# Patient Record
Sex: FEMALE | Race: ASIAN | NOT HISPANIC OR LATINO | URBAN - METROPOLITAN AREA
[De-identification: names, ages, dates, MRNs, and addresses within clinical notes are randomized per-mention and may not be internally consistent; named-entity substitution may affect disease eponyms.]

---

## 2024-09-04 ENCOUNTER — OFFICE VISIT (OUTPATIENT)
Dept: URGENT CARE | Facility: CLINIC | Age: 59
End: 2024-09-04
Payer: COMMERCIAL

## 2024-09-04 VITALS
SYSTOLIC BLOOD PRESSURE: 142 MMHG | DIASTOLIC BLOOD PRESSURE: 88 MMHG | TEMPERATURE: 98.5 F | RESPIRATION RATE: 14 BRPM | HEART RATE: 88 BPM | OXYGEN SATURATION: 97 %

## 2024-09-04 DIAGNOSIS — G89.29 CHRONIC PAIN OF BOTH SHOULDERS: Primary | ICD-10-CM

## 2024-09-04 DIAGNOSIS — M25.512 CHRONIC PAIN OF BOTH SHOULDERS: Primary | ICD-10-CM

## 2024-09-04 DIAGNOSIS — M25.511 CHRONIC PAIN OF BOTH SHOULDERS: Primary | ICD-10-CM

## 2024-09-04 PROBLEM — M79.18 MYOFASCIAL PAIN: Status: ACTIVE | Noted: 2022-01-06

## 2024-09-04 PROBLEM — Z74.09 IMPAIRED FUNCTIONAL MOBILITY, BALANCE, GAIT, AND ENDURANCE: Status: ACTIVE | Noted: 2022-01-06

## 2024-09-04 PROBLEM — C81.10: Status: ACTIVE | Noted: 2022-04-18

## 2024-09-04 PROBLEM — E66.01 MORBID (SEVERE) OBESITY DUE TO EXCESS CALORIES (HCC): Status: ACTIVE | Noted: 2023-10-19

## 2024-09-04 PROCEDURE — 99203 OFFICE O/P NEW LOW 30 MIN: CPT | Performed by: FAMILY MEDICINE

## 2024-09-04 RX ORDER — ATORVASTATIN CALCIUM 20 MG/1
20 TABLET, FILM COATED ORAL
COMMUNITY

## 2024-09-04 RX ORDER — ONDANSETRON 8 MG/1
8 TABLET, FILM COATED ORAL
COMMUNITY

## 2024-09-04 RX ORDER — LEVOTHYROXINE SODIUM 25 UG/1
25 TABLET ORAL
COMMUNITY

## 2024-09-04 RX ORDER — PANTOPRAZOLE SODIUM 40 MG/1
40 TABLET, DELAYED RELEASE ORAL DAILY
COMMUNITY

## 2024-09-04 RX ORDER — OXYCODONE AND ACETAMINOPHEN 5; 325 MG/1; MG/1
1 TABLET ORAL EVERY 12 HOURS PRN
COMMUNITY

## 2024-09-04 RX ORDER — HYDROCORTISONE 5 MG/1
TABLET ORAL
COMMUNITY

## 2024-09-04 RX ORDER — OXYCODONE HYDROCHLORIDE 5 MG/1
5 CAPSULE ORAL
COMMUNITY

## 2024-09-04 RX ORDER — CYCLOBENZAPRINE HCL 10 MG
10 TABLET ORAL
COMMUNITY

## 2024-09-04 RX ORDER — DICYCLOMINE HCL 20 MG
20 TABLET ORAL 2 TIMES DAILY
COMMUNITY

## 2024-09-04 RX ORDER — GLIMEPIRIDE 2 MG/1
2 TABLET ORAL DAILY
COMMUNITY
Start: 2024-08-26

## 2024-09-04 RX ORDER — BLOOD SUGAR DIAGNOSTIC
STRIP MISCELLANEOUS DAILY
COMMUNITY
Start: 2024-07-23

## 2024-09-04 RX ORDER — PIOGLITAZONEHYDROCHLORIDE 15 MG/1
15 TABLET ORAL DAILY
COMMUNITY
Start: 2024-08-26

## 2024-09-04 NOTE — PROGRESS NOTES
[facility]        NAME: Lesia Banerjee is a 59 y.o. female  : 1965    MRN: [id number]  DATE: 2024  TIME: 6:01 PM    Assessment and Plan   Chronic pain of both shoulders [M25.511, G89.29, M25.512]  1. Chronic pain of both shoulders  Transfer to other facility        Patient Instructions     Patient Instructions   Patient has chronic bilateral shoulder pain, has had multiple surgeries on both shoulders, patient currently taking Oxycodone PRN and sees Orthopedics and pain management at [facility], next appt scheduled for  with Orthopedics and  with pain management. Patient experiencing breakthrough pain at this time despite treatment as recommended by Ortho and pain mgmt, therefore patient has been referred to the ER for further evaluation and treatment at this time. Patient has chosen to go to [facility] ER and will be driving herself. She states she feels safe and comfortable driving herself.    Follow up with PCP in 3-5 days.  Proceed to  ER if symptoms worsen.    If tests have been performed at [facility], our office will contact you with results if changes need to be made to the care plan discussed with you at the visit.  [facility]    Chief Complaint     Chief Complaint   Patient presents with    Pain     Pt presents with chronic pain of the bilateral shoulders ongoing post surgery.     History of Present Illness     58 yo F presents c/o bilateral shoulder pain. Patient's BL shoulder pain is chronic related to rotator cuff problems, and has been ongoing for years. Patient has had multiple surgeries on both shoulders and per review of records, patient seems to feels that the pain worsens after each surgery. Joint replacement surgery was discussed, however the surgeon did not feel that the patient would benefit from that. Patient sees both Orthopedics and pain management. She has had cortisone joint injections and  is currently on oxycodone prn for the pain. She states she is experiencing breakthrough bilateral shoulder pain at this time and was instructed to be seen at urgent care for further pain control options. She states she is scheduled to see Orthopedics on 9/19 and pain management on 9/20. The pain denies any new injuries to the shoulder. No recent falls or MVAs. Patient states she was playing with her grandson over the weekend and is unsure if she might have strained her shoulders. She denies any pain radiating down the arms. No numbness/tingling or weakness of the arms. No chest pain, SOB, or palpitations. No neck or back pain. Patient had xrays of the shoulders performed in May 2024 which showed moderate osteoarthritic degenerative changes of the glenohumeral joint, and mild hypertrophic degenerative changes of the acromioclavicular joint in both shoulders, and calcific tendinosis of the rotator cuff was noted in the left shoulder.      Review of Systems   Review of Systems   Constitutional: Negative.    Respiratory: Negative.     Cardiovascular: Negative.    Skin: Negative.    Allergic/Immunologic:        As noted in chart   Neurological: Negative.    Hematological: Negative.      Current Medications       Current Outpatient Medications:     atorvastatin (LIPITOR) 20 mg tablet, Take 20 mg by mouth, Disp: , Rfl:     cyclobenzaprine (FLEXERIL) 10 mg tablet, Take 10 mg by mouth daily at bedtime as needed, Disp: , Rfl:     dicyclomine (BENTYL) 20 mg tablet, Take 20 mg by mouth 2 (two) times a day, Disp: , Rfl:     glimepiride (AMARYL) 2 mg tablet, Take 2 mg by mouth daily, Disp: , Rfl:     hydrocortisone (CORTEF) 5 mg tablet, TAKE 3 TABLETS IN THE MORNING AND 1 TABLET MID DAY AND CONTINUE TO FOLLOW SICK DAY RULES, Disp: , Rfl:     levothyroxine 25 mcg tablet, Take 25 mcg by mouth, Disp: , Rfl:     ondansetron (ZOFRAN) 8 mg tablet, Take 8 mg by mouth, Disp: , Rfl:     OneTouch Verio test strip, daily Test, Disp: , Rfl:      oxyCODONE (OXY-IR) 5 MG capsule, Take 5 mg by mouth, Disp: , Rfl:     oxyCODONE-acetaminophen (PERCOCET) 5-325 mg per tablet, Take 1 tablet by mouth every 12 (twelve) hours as needed, Disp: , Rfl:     pantoprazole (PROTONIX) 40 mg tablet, Take 40 mg by mouth daily, Disp: , Rfl:     pioglitazone (ACTOS) 15 mg tablet, Take 15 mg by mouth daily, Disp: , Rfl:     Current Allergies     Allergies as of 09/04/2024 - Reviewed 09/04/2024   Allergen Reaction Noted    Metoclopramide Tongue Swelling and Tremor 10/19/2023    Ramipril Angioedema 10/19/2023            The following portions of the patient's history were reviewed and updated as appropriate: allergies, current medications, past family history, past medical history, past social history, past surgical history and problem list.     Past Medical History:   Diagnosis Date    Diabetes mellitus (HCC)     Disease of thyroid gland     Hypertension        Past Surgical History:   Procedure Laterality Date    KNEE SURGERY Bilateral     SHOULDER SURGERY Bilateral        History reviewed. No pertinent family history.      Medications have been verified.        Objective   /88   Pulse 88   Temp 98.5 °F (36.9 °C)   Resp 14   SpO2 97%   No LMP recorded. Patient is postmenopausal.       Physical Exam     Physical Exam  Vitals and nursing note reviewed.   Constitutional:       General: She is awake. She is not in acute distress.     Appearance: Normal appearance. She is well-developed and well-groomed. She is not ill-appearing, toxic-appearing or diaphoretic.   HENT:      Head: Normocephalic and atraumatic.   Neck:      Trachea: Trachea and phonation normal.   Cardiovascular:      Rate and Rhythm: Normal rate and regular rhythm.      Pulses: Normal pulses.      Heart sounds: Normal heart sounds.   Pulmonary:      Effort: Pulmonary effort is normal. No tachypnea, accessory muscle usage or respiratory distress.      Breath sounds: Normal breath sounds.   Musculoskeletal:       Cervical back: Normal range of motion and neck supple. No edema, erythema, signs of trauma, rigidity or tenderness. No pain with movement, spinous process tenderness or muscular tenderness. Normal range of motion.      Comments: Right shoulder: no swelling, erythema, bruising, rashes, or wounds. Skin is appropriately warm and intact. Healed surgical scars present. There is mild generalized discomfort to palpation of the shoulder joint. Patient's ROM at the shoulder is limited due to pain. Strength and sensations are intact.   Left shoulder: no swelling, erythema, bruising, rashes, or wounds. Skin is appropriately warm and intact. Healed surgical scars present. There is mild generalized discomfort to palpation of the shoulder joint. Patient's ROM at the shoulder is limited due to pain. Strength and sensations are intact.   Skin:     General: Skin is warm and dry.      Capillary Refill: Capillary refill takes less than 2 seconds.      Coloration: Skin is not pale.      Findings: No abrasion, bruising, ecchymosis, erythema, rash or wound.   Neurological:      Mental Status: She is alert and oriented to person, place, and time. Mental status is at baseline.   Psychiatric:         Mood and Affect: Mood normal.         Behavior: Behavior normal. Behavior is cooperative.         Thought Content: Thought content normal.         Judgment: Judgment normal.

## 2024-09-04 NOTE — PATIENT INSTRUCTIONS
Patient has chronic bilateral shoulder pain, has had multiple surgeries on both shoulders, patient currently taking Oxycodone PRN and sees Orthopedics and pain management at [facility], next appt scheduled for 9/19 with Orthopedics and 9/20 with pain management. Patient experiencing breakthrough pain at this time despite treatment as recommended by Ortho and pain mgmt, therefore patient has been referred to the ER for further evaluation and treatment at this time. Patient has chosen to go to [facility] ER and will be driving herself. She states she feels safe and comfortable driving herself.

## 2025-07-03 ENCOUNTER — TELEPHONE (OUTPATIENT)
Age: 60
End: 2025-07-03

## 2025-07-03 NOTE — TELEPHONE ENCOUNTER
Caller: Ronald[employer]    Doctor: Dr. arzola    Reason for call: Questioned if the patient has ever been treated by [facility]? No    Call back#: [phone]